# Patient Record
Sex: FEMALE | Race: OTHER | HISPANIC OR LATINO | URBAN - METROPOLITAN AREA
[De-identification: names, ages, dates, MRNs, and addresses within clinical notes are randomized per-mention and may not be internally consistent; named-entity substitution may affect disease eponyms.]

---

## 2022-04-21 ENCOUNTER — EMERGENCY (EMERGENCY)
Facility: HOSPITAL | Age: 67
LOS: 1 days | Discharge: HOME | End: 2022-04-21
Attending: STUDENT IN AN ORGANIZED HEALTH CARE EDUCATION/TRAINING PROGRAM
Payer: COMMERCIAL

## 2022-04-21 VITALS
HEART RATE: 66 BPM | OXYGEN SATURATION: 96 % | WEIGHT: 160.94 LBS | RESPIRATION RATE: 18 BRPM | DIASTOLIC BLOOD PRESSURE: 99 MMHG | SYSTOLIC BLOOD PRESSURE: 184 MMHG | TEMPERATURE: 98 F

## 2022-04-21 VITALS
DIASTOLIC BLOOD PRESSURE: 97 MMHG | RESPIRATION RATE: 18 BRPM | SYSTOLIC BLOOD PRESSURE: 167 MMHG | HEART RATE: 69 BPM | OXYGEN SATURATION: 96 % | TEMPERATURE: 98 F

## 2022-04-21 LAB
ALBUMIN SERPL ELPH-MCNC: 3.5 G/DL — SIGNIFICANT CHANGE UP (ref 3.5–5)
ALP SERPL-CCNC: 82 U/L — SIGNIFICANT CHANGE UP (ref 40–120)
ALT FLD-CCNC: 37 U/L DA — SIGNIFICANT CHANGE UP (ref 10–60)
ANION GAP SERPL CALC-SCNC: 5 MMOL/L — SIGNIFICANT CHANGE UP (ref 5–17)
AST SERPL-CCNC: 25 U/L — SIGNIFICANT CHANGE UP (ref 10–40)
BASOPHILS # BLD AUTO: 0.03 K/UL — SIGNIFICANT CHANGE UP (ref 0–0.2)
BASOPHILS NFR BLD AUTO: 0.4 % — SIGNIFICANT CHANGE UP (ref 0–2)
BILIRUB SERPL-MCNC: 0.4 MG/DL — SIGNIFICANT CHANGE UP (ref 0.2–1.2)
BUN SERPL-MCNC: 16 MG/DL — SIGNIFICANT CHANGE UP (ref 7–18)
CALCIUM SERPL-MCNC: 9.6 MG/DL — SIGNIFICANT CHANGE UP (ref 8.4–10.5)
CHLORIDE SERPL-SCNC: 109 MMOL/L — HIGH (ref 96–108)
CO2 SERPL-SCNC: 26 MMOL/L — SIGNIFICANT CHANGE UP (ref 22–31)
CREAT SERPL-MCNC: 0.63 MG/DL — SIGNIFICANT CHANGE UP (ref 0.5–1.3)
EGFR: 99 ML/MIN/1.73M2 — SIGNIFICANT CHANGE UP
EOSINOPHIL # BLD AUTO: 0.37 K/UL — SIGNIFICANT CHANGE UP (ref 0–0.5)
EOSINOPHIL NFR BLD AUTO: 5.4 % — SIGNIFICANT CHANGE UP (ref 0–6)
GLUCOSE SERPL-MCNC: 174 MG/DL — HIGH (ref 70–99)
HCT VFR BLD CALC: 37.2 % — SIGNIFICANT CHANGE UP (ref 34.5–45)
HGB BLD-MCNC: 12.4 G/DL — SIGNIFICANT CHANGE UP (ref 11.5–15.5)
IMM GRANULOCYTES NFR BLD AUTO: 0.3 % — SIGNIFICANT CHANGE UP (ref 0–1.5)
LYMPHOCYTES # BLD AUTO: 2.42 K/UL — SIGNIFICANT CHANGE UP (ref 1–3.3)
LYMPHOCYTES # BLD AUTO: 35.3 % — SIGNIFICANT CHANGE UP (ref 13–44)
MAGNESIUM SERPL-MCNC: 1.9 MG/DL — SIGNIFICANT CHANGE UP (ref 1.6–2.6)
MCHC RBC-ENTMCNC: 28 PG — SIGNIFICANT CHANGE UP (ref 27–34)
MCHC RBC-ENTMCNC: 33.3 GM/DL — SIGNIFICANT CHANGE UP (ref 32–36)
MCV RBC AUTO: 84 FL — SIGNIFICANT CHANGE UP (ref 80–100)
MONOCYTES # BLD AUTO: 0.59 K/UL — SIGNIFICANT CHANGE UP (ref 0–0.9)
MONOCYTES NFR BLD AUTO: 8.6 % — SIGNIFICANT CHANGE UP (ref 2–14)
NEUTROPHILS # BLD AUTO: 3.42 K/UL — SIGNIFICANT CHANGE UP (ref 1.8–7.4)
NEUTROPHILS NFR BLD AUTO: 50 % — SIGNIFICANT CHANGE UP (ref 43–77)
NRBC # BLD: 0 /100 WBCS — SIGNIFICANT CHANGE UP (ref 0–0)
PLATELET # BLD AUTO: 283 K/UL — SIGNIFICANT CHANGE UP (ref 150–400)
POTASSIUM SERPL-MCNC: 3.9 MMOL/L — SIGNIFICANT CHANGE UP (ref 3.5–5.3)
POTASSIUM SERPL-SCNC: 3.9 MMOL/L — SIGNIFICANT CHANGE UP (ref 3.5–5.3)
PROT SERPL-MCNC: 8.1 G/DL — SIGNIFICANT CHANGE UP (ref 6–8.3)
RBC # BLD: 4.43 M/UL — SIGNIFICANT CHANGE UP (ref 3.8–5.2)
RBC # FLD: 14.8 % — HIGH (ref 10.3–14.5)
SODIUM SERPL-SCNC: 140 MMOL/L — SIGNIFICANT CHANGE UP (ref 135–145)
WBC # BLD: 6.85 K/UL — SIGNIFICANT CHANGE UP (ref 3.8–10.5)
WBC # FLD AUTO: 6.85 K/UL — SIGNIFICANT CHANGE UP (ref 3.8–10.5)

## 2022-04-21 PROCEDURE — 80053 COMPREHEN METABOLIC PANEL: CPT

## 2022-04-21 PROCEDURE — 99283 EMERGENCY DEPT VISIT LOW MDM: CPT

## 2022-04-21 PROCEDURE — 36415 COLL VENOUS BLD VENIPUNCTURE: CPT

## 2022-04-21 PROCEDURE — 93005 ELECTROCARDIOGRAM TRACING: CPT

## 2022-04-21 PROCEDURE — 93010 ELECTROCARDIOGRAM REPORT: CPT | Mod: 76

## 2022-04-21 PROCEDURE — 83735 ASSAY OF MAGNESIUM: CPT

## 2022-04-21 PROCEDURE — 82962 GLUCOSE BLOOD TEST: CPT

## 2022-04-21 PROCEDURE — 85025 COMPLETE CBC W/AUTO DIFF WBC: CPT

## 2022-04-21 RX ORDER — SODIUM CHLORIDE 9 MG/ML
1000 INJECTION INTRAMUSCULAR; INTRAVENOUS; SUBCUTANEOUS ONCE
Refills: 0 | Status: COMPLETED | OUTPATIENT
Start: 2022-04-21 | End: 2022-04-21

## 2022-04-21 RX ORDER — MECLIZINE HCL 12.5 MG
1 TABLET ORAL
Qty: 12 | Refills: 0
Start: 2022-04-21 | End: 2022-04-23

## 2022-04-21 RX ORDER — MECLIZINE HCL 12.5 MG
25 TABLET ORAL ONCE
Refills: 0 | Status: COMPLETED | OUTPATIENT
Start: 2022-04-21 | End: 2022-04-21

## 2022-04-21 RX ORDER — ACETAMINOPHEN 500 MG
975 TABLET ORAL ONCE
Refills: 0 | Status: COMPLETED | OUTPATIENT
Start: 2022-04-21 | End: 2022-04-21

## 2022-04-21 RX ADMIN — Medication 25 MILLIGRAM(S): at 22:34

## 2022-04-21 RX ADMIN — SODIUM CHLORIDE 1000 MILLILITER(S): 9 INJECTION INTRAMUSCULAR; INTRAVENOUS; SUBCUTANEOUS at 20:48

## 2022-04-21 RX ADMIN — Medication 25 MILLIGRAM(S): at 20:48

## 2022-04-21 RX ADMIN — Medication 975 MILLIGRAM(S): at 20:48

## 2022-04-21 NOTE — ED PROVIDER NOTE - PHYSICAL EXAMINATION
General: well appearing female, no acute distress   HEENT: normocephalic, atraumatic   Respiratory: normal work of breathing, lungs clear to auscultation bilaterally   Cardiac: regular rate and rhythm   Abdomen: soft, non-tender, no guarding or rebound   MSK: no swelling or tenderness of lower extremities, moving all extremities spontaneously   Skin: warm, dry   Neuro: A&Ox3, cranial nerves II-XII intact, 5/5 strength in all extremities   Psych: appropriate affect

## 2022-04-21 NOTE — ED PROVIDER NOTE - OBJECTIVE STATEMENT
66F, pmh of htn presenting with one day of dizziness. feels like room spinning. no headache, changes in vision, chest pain or shortness of breath.

## 2022-04-21 NOTE — ED PROVIDER NOTE - CLINICAL SUMMARY MEDICAL DECISION MAKING FREE TEXT BOX
66F presenting with dizziness. non-focal neuro exam. likely peripheral vertigo. will get labs, symptom control. will reassess.

## 2022-04-21 NOTE — ED PROVIDER NOTE - PATIENT PORTAL LINK FT
You can access the FollowMyHealth Patient Portal offered by [facility] by registering at the following website: [email or web address]. By joining [employer]’s FollowMyHealth portal, you will also be able to view your health information using other applications (apps) compatible with our system.

## 2022-04-21 NOTE — ED PROVIDER NOTE - NSFOLLOWUPINSTRUCTIONS_ED_ALL_ED_FT
You were seen in the emergency department for dizziness.     Please follow-up with your primary care doctor in the next 24-48 hours.     If you have any worsening symptoms, severe headache, chest pain, shortness of breath, please return to the emergency department. Lo vieron en el departamento de emergencias por mareos.    Samuel un seguimiento con norman médico de atención primaria en las próximas 24 a 48 horas.    Si tiene algún síntoma que empeora, dolor de blane intenso, dolor en el pecho, dificultad para respirar, regrese al departamento de emergencias.    Translation via Google Translate. Cannot guarantee accuracy.     You were seen in the emergency department for dizziness.   Please follow-up with your primary care doctor in the next 24-48 hours.   If you have any worsening symptoms, severe headache, chest pain, shortness of breath, please return to the emergency department.

## 2022-04-21 NOTE — ED ADULT NURSE NOTE - OBJECTIVE STATEMENT
Patient presented to the ED with c/o dizziness. Denies any headache, chest pain, SOB, palpitations, nausea and vomiting.

## 2024-01-06 NOTE — ED PROVIDER NOTE - INTERNATIONAL TRAVEL
LVM regarding below x 2. Pain line number left to return call.   No 1 Principal Discharge DX:	Abdominal pain

## 2024-02-20 ENCOUNTER — EMERGENCY (EMERGENCY)
Facility: HOSPITAL | Age: 69
LOS: 1 days | Discharge: HOME | End: 2024-02-20
Attending: EMERGENCY MEDICINE
Payer: COMMERCIAL

## 2024-02-20 VITALS
TEMPERATURE: 98 F | RESPIRATION RATE: 20 BRPM | HEART RATE: 85 BPM | DIASTOLIC BLOOD PRESSURE: 80 MMHG | SYSTOLIC BLOOD PRESSURE: 137 MMHG | OXYGEN SATURATION: 98 %

## 2024-02-20 PROCEDURE — 99283 EMERGENCY DEPT VISIT LOW MDM: CPT | Mod: 25

## 2024-02-20 PROCEDURE — 73562 X-RAY EXAM OF KNEE 3: CPT | Mod: 26,LT

## 2024-02-20 PROCEDURE — 73562 X-RAY EXAM OF KNEE 3: CPT

## 2024-02-20 PROCEDURE — 99284 EMERGENCY DEPT VISIT MOD MDM: CPT

## 2024-02-20 RX ORDER — IBUPROFEN 200 MG
600 TABLET ORAL ONCE
Refills: 0 | Status: COMPLETED | OUTPATIENT
Start: 2024-02-20 | End: 2024-02-20

## 2024-02-20 RX ADMIN — Medication 600 MILLIGRAM(S): at 16:54

## 2024-02-20 RX ADMIN — Medication 600 MILLIGRAM(S): at 16:24

## 2024-02-20 NOTE — ED PROVIDER NOTE - NS ED ROS FT
Constitutional: (-) fever (-) chills  Musculoskeletal: (-) gait problem (+) knee pain   Skin: (-) wound  Neurological: (-) weakness (-) numbness

## 2024-02-20 NOTE — ED ADULT NURSE NOTE - OBJECTIVE STATEMENT
Patient presented to the ED complaining of left knee pain for 2 weeks. Patient denies any injury or fall.

## 2024-02-20 NOTE — ED PROVIDER NOTE - PHYSICAL EXAMINATION
Gen:  Awake, alert, NAD, WDWN, NCAT, non-toxic appearing.   Eyes:  PERRL, EOMI, no icterus, normal lids/lashes, normal conjunctivae.  ENT:  External inspection normal, pink/moist membranes.   CV:  Warm/well-perfused, no JVD. 2+ dp/pt, no edema, cords, or homans.  Resp:  Normal respiratory rate/effort, no respiratory distress, normal voice, speaking full sentences, no retractions.  Musculoskeletal:  N/V intact, FROM all 4 extremities, stable gait. L hip/ankle/foot: FROM, stable joints, no effusion/swelling, no cuco tender/deformity. L knee:  FROM/full extension, neg ant/post drawer, neg varus/valgus tender, neg grind, no effusion/swelling, no cuco tender/deformity.   Neck:  FROM neck, trachea midline.  Skin:  Color normal for race, warm and dry, no rash to visible skin regions. No lacerations, abrasions, or ecchymosis.   Neuro:  Oriented x3, CN 2-12 intact (grossly), normal motor (grossly), normal sensory (grossly), normal gait.  Psych:  Attention normal. Affect normal. Behavior normal. Judgment normal.

## 2024-02-20 NOTE — ED PROVIDER NOTE - NSFOLLOWUPINSTRUCTIONS_ED_ALL_ED_FT
Please follow up with your PMD or Medicine Clinic in 2-3 days.  Follow up with Orthopedics in 1 week.  Return to the ER for worsening or concerning symptoms.  Take Ibuprofen (Advil or Motrin) 600mg every 6 hours as needed for pain or fever with food.  Take Acetaminophen (Tylenol) 650mg every 6 hours as needed for pain or fever.  Apply ice to swollen joints or areas, rest extremity and keep elevated.    - - - - - - - - - - - - -  Dolor gideon de rodilla en los adultos  Acute Knee Pain, Adult  El dolor de rodilla puede tener muchas causas. A veces, el dolor de rodilla es repentino (gideon) y puede deberse a daño, hinchazón o irritación de los músculos y tejidos que sostienen la rodilla.    A menudo, el dolor desaparece solo con el tiempo y con reposo. Si el dolor no desaparece, pueden hacerse pruebas para hallar norman causa.    Siga estas instrucciones en norman casa:  Si tiene sandoval rodillera o un dispositivo ortopédico:      Use la rodillera o el dispositivo ortopédico lydia se lo haya indicado el médico. Quíteselos solamente lydia se lo haya indicado el médico.  Aflójeselos si los dedos del pie:  Hormiguean.  Se adormecen.  Se tornan fríos y de color hortencia.  Manténgalos limpios.  Si la rodillera o el dispositivo ortopédico no son impermeables:  No deje que se mojen.  Cúbralos con un envoltorio hermético cuando tome un baño de inmersión o sandoval ducha.  Actividad    Descanse la rodilla.  No jaquan cosas que le causen dolor o que lo intensifiquen.  Evite las actividades en las que ambos pies no estén en contacto con el piso al mismo tiempo (actividades de alto impacto). Algunos ejemplos son correr, saltar la soga y hacer eliza de tijera.  Trabaje con un fisioterapeuta para crear un programa de ejercicios seguros, lydia le haya indicado el médico.  Control del dolor, la rigidez y la hinchazón      Si se lo indican, aplique hielo sobre la rodilla. Para hacer esto:  Si tiene sandoval rodillera o un dispositivo ortopédico que se puede quitar, proceda lydia se lo haya indicado el médico.  Ponga el hielo en sandoval bolsa plástica.  Coloque sandoval toalla entre la piel y la bolsa.  Aplique el hielo anila 20 minutos, 2 o 3 veces por día.  Retire el hielo si la piel se le pone de color cifuentes brillante. [place] es muy importante. Si no puede sentir dolor, calor o frío, tiene un mayor riesgo de que se dañe la rocio.  Si se lo indican, use sandoval venda elástica para ejercer presión (compresión) en la rodilla lesionada.  Cuando esté sentado o acostado, eleve la rodilla por encima del nivel del corazón.  Duerma con sandoval almohada debajo de la rodilla.  Indicaciones generales    Use los medicamentos de venta henry y los recetados solamente lydia se lo haya indicado el médico.  No fume ni consuma ningún producto que contenga nicotina o tabaco. Si necesita ayuda para dejar de consumir estos productos, consulte al médico.  Si tiene sobrepeso, trabaje con norman médico y un experto en alimentos (nutricionista) para establecer metas para bajar de peso. El sobrepeso puede aumentar el dolor de rodilla.  Controle si hay algún cambio en po síntomas.  Cumpla con todas las visitas de seguimiento.  Comuníquese con un médico si:  El dolor de rodilla no desaparece.  El dolor de rodilla cambia o empeora.  Tiene fiebre junto con dolor de rodilla.  La rodilla está enrojecida o se siente caliente al tacto.  La rodilla le magnus o se le queda trabada.  Solicite ayuda de inmediato si:  La rodilla se le hincha, y la hinchazón empeora.  No puede  la rodilla.  Tiene un dolor muy intenso en la rodilla que no se shoaib con analgésicos.  Resumen  El dolor de rodilla puede tener muchas causas. A menudo, el dolor desaparece solo con el tiempo y con reposo.  El médico puede hacerle estudios para averiguar la causa del dolor.  Controle si hay algún cambio en po síntomas. Alivie el dolor con descanso, medicamentos, actividad de poca intensidad y el uso de hielo.  Solicite ayuda de inmediato si no puede  la rodilla o si el dolor de rodilla es muy intenso.  Esta información no tiene lydia fin reemplazar el consejo del médico. Asegúrese de hacerle al médico cualquier pregunta que tenga.

## 2024-02-20 NOTE — ED PROVIDER NOTE - PATIENT PORTAL LINK FT
You can access the FollowMyHealth Patient Portal offered by [facility] by registering at the following website: [email or web address]. By joining [employer]s FollowMyHealth portal, you will also be able to view your health information using other applications (apps) compatible with our system.

## 2024-02-20 NOTE — ED PROVIDER NOTE - CLINICAL SUMMARY MEDICAL DECISION MAKING FREE TEXT BOX
68 female with hx of DM, & HTN.   Pt presenting to the ED reporting left knee pain x 2 weeks worse with ambulation & movement.  No trauma.  No new exercise or strain.    Vitals stable.  Nontoxic appearing, n/v intact.  Airway intact, no respiratory distress, no hypoxia.  No evidence of septic arthritis.    Plan to obtain:    -XR, analgesia as needed, likely discharge with orthopedics follow-up.    Lab values demonstrate no acute/emergent pathology.  My independent interpretation of XR: No fx/dislocation  Analgesia given    Pt advised regarding need for close outpatient follow up.  Patient stable for further care in outpatient setting. No indication for inpatient admission at this time. Patient advised regarding symptomatic & supportive care and symptoms to prompt ED return. Strict return precautions provided.

## 2024-02-20 NOTE — ED PROVIDER NOTE - OBJECTIVE STATEMENT
68 female with hx of DM, & HTN.   Pt presenting to the ED reporting left knee pain x 2 weeks worse with ambulation & movement.  No trauma.  No new exercise or strain.  No recent travel, hospitalization, or immobilization

## 2024-02-20 NOTE — ED PROVIDER NOTE - NSFOLLOWUPCLINICS_GEN_ALL_ED_FT
[facility]  Internal Medicine  [address] [place] [address]  Phone: [phone]  Fax: [phone]  Follow Up Time: 1-3 Days    [facility]  Orthopedics  [address] [place] [address]  Phone: [phone]  Fax: [phone]  Follow Up Time: 4-6 Days

## 2024-03-18 ENCOUNTER — APPOINTMENT (OUTPATIENT)
Age: 69
End: 2024-03-18
Payer: COMMERCIAL

## 2024-03-18 VITALS
DIASTOLIC BLOOD PRESSURE: 69 MMHG | WEIGHT: 154 LBS | SYSTOLIC BLOOD PRESSURE: 137 MMHG | OXYGEN SATURATION: 99 % | BODY MASS INDEX: 32.32 KG/M2 | HEIGHT: 58 IN | HEART RATE: 97 BPM

## 2024-03-18 DIAGNOSIS — M25.561 PAIN IN RIGHT KNEE: ICD-10-CM

## 2024-03-18 DIAGNOSIS — M25.562 PAIN IN RIGHT KNEE: ICD-10-CM

## 2024-03-18 PROBLEM — E11.9 TYPE 2 DIABETES MELLITUS WITHOUT COMPLICATIONS: Chronic | Status: ACTIVE | Noted: 2024-02-20

## 2024-03-18 PROBLEM — Z00.00 ENCOUNTER FOR PREVENTIVE HEALTH EXAMINATION: Status: ACTIVE | Noted: 2024-03-18

## 2024-03-18 PROBLEM — I10 ESSENTIAL (PRIMARY) HYPERTENSION: Chronic | Status: ACTIVE | Noted: 2024-02-20

## 2024-03-18 PROCEDURE — 20611 DRAIN/INJ JOINT/BURSA W/US: CPT | Mod: LT

## 2024-03-18 PROCEDURE — 99204 OFFICE O/P NEW MOD 45 MIN: CPT | Mod: 25

## 2024-03-18 NOTE — HISTORY OF PRESENT ILLNESS
[de-identified] : SHERIE MOORE is a 68 year old female presenting with acute on chronic left knee pain over the last few months with recent worsening.  She is also dealing with right-sided Achilles and heel pain.  In terms of her left knee she says the pain has gotten so bad that it is present constant throughout the day and is exacerbated with walking.  The pain is mostly in the front of the knee as well as the inside of the knee and does feel it at times diffusely throughout the entirety of the knee.  She has not had any treatment up until this point however states she is in significant pain unable to ambulate perform her activities at work or perform her ADLs due to the pain.

## 2024-03-18 NOTE — PHYSICAL EXAM
[de-identified] : Knee (left)   Inspection  Skin: normal  Effusion: normal  Bursa swelling: none   Palpation  Tenderness: Severe Location: Medial and lateral facets, medial joint line  Crepitus: none.  Defect: none.  Popliteal fullness: negative.   Flexion  Active ROM: Limited to 90 degrees Passive ROM: Limited to 100 degrees  Extension  Limited by 5 degrees    Straight Leg Raise- can perform  Motor strength  Flexion: 5/5  Extension: 5/5   Sensory index  Normal.   ACL/PCL tests  Lachman test: stable  Anterior drawer: stable  Posterior drawer: stable   MCL/LCL tests  MCL laxity: stable  LCL laxity: stable   Patellofemoral tests  Patellar grind test: Positive Patellar apprehension: negative   Meniscal tests  Deidra's test: normal  Thessalys: Normal  [de-identified] : XR of left knee Date: 3/2024   Views: 3 views Performed at [facility]: No Impression: Mild medial compartment joint space narrowing with mild sclerotic changes consistent with mild to moderate knee arthritis.  Also large patellar tendon enthesophyte noted.  These images were personally reviewed with original findings documented as above.

## 2024-03-18 NOTE — DISCUSSION/SUMMARY
[de-identified] : SHERIE MOORE is a 68 year old female presenting with acute on chronic significant left knee pain consistent with mild to moderate knee osteoarthritis flare.  Patient has not trialed any treatments at this point however she is in significant pain and is unable to ambulate or perform her duties at work at this time due to the pain.  Discussed with patient's these flareups will come and go and getting the appropriate treatment options will be helpful.  Overall recommend the followin.  Left knee CSI performed today as above with moderate postprocedural relief 2.  Referral given to physical therapy which I discussed with the patient she would likely be able to perform moving forward once the pain subsides a bit 3.  Diclofenac 75 mg to be taken up to twice a day for the next 2 to 4 weeks prescribed 4.  Letter excusing the patient from work for the next 2 weeks was given.  At that time she will follow-up to be reevaluated.

## 2024-03-18 NOTE — RETURN TO WORK/SCHOOL
[Work] : work [___ Weeks] : I will re-evaluate the patient in [unfilled] week(s), at which time I will provide an update to their current status [FreeTextEntry2] : Lazarus Erwin MD, FAAPMR, CAQSM   Sports Medicine Physiatrist [facility]

## 2024-03-18 NOTE — PROCEDURE
[de-identified] : Ultrasound Guided Injection   Indication: Ensure placement within the intra-articular knee joint utilizing the [employer] portable ultrasound machine, the Linear 25mm 15-4 MHz transducer, sterile ultrasound gel, ultrasound guidance with the probe in short axis to the joint , utilizing an in-plane approach, was used for the following injection:    Injection: LEFT intra-articular knee joint.  Indication: Knee osteoarthritis.  A discussion was had with the patient regarding this procedure and all questions were answered. All risks, benefits and alternatives were discussed. These included but were not limited to bleeding, infection, and allergic reaction. A timeout was performed prior to the procedure to ensure proper side.  Chlorhexidine was used to sterilize the skin overlying the knee joint.  A 21-gauge needle was used to inject 1cc of 0.5% Ropivacaine, 1cc 1% Lidocaine, 1cc of 40mg/mL Depo-Medrol into the joint from a superolateral approach. A sterile bandage was then applied. The patient tolerated the procedure well and there were no complications.

## 2024-03-21 RX ORDER — DICLOFENAC SODIUM 75 MG/1
75 TABLET, DELAYED RELEASE ORAL
Qty: 42 | Refills: 1 | Status: ACTIVE | COMMUNITY
Start: 2024-03-21 | End: 1900-01-01

## 2024-04-01 ENCOUNTER — APPOINTMENT (OUTPATIENT)
Age: 69
End: 2024-04-01
Payer: COMMERCIAL

## 2024-04-01 VITALS
OXYGEN SATURATION: 92 % | WEIGHT: 152 LBS | BODY MASS INDEX: 31.91 KG/M2 | SYSTOLIC BLOOD PRESSURE: 154 MMHG | HEART RATE: 87 BPM | DIASTOLIC BLOOD PRESSURE: 90 MMHG | HEIGHT: 58 IN

## 2024-04-01 DIAGNOSIS — M17.12 UNILATERAL PRIMARY OSTEOARTHRITIS, LEFT KNEE: ICD-10-CM

## 2024-04-01 PROCEDURE — 99213 OFFICE O/P EST LOW 20 MIN: CPT

## 2024-04-01 NOTE — HISTORY OF PRESENT ILLNESS
[de-identified] : SHERIE MOORE is a 68 year old female presenting with acute on chronic left knee pain over the last few months with recent worsening.  She is also dealing with right-sided Achilles and heel pain.  In terms of her left knee she says the pain has gotten so bad that it is present constant throughout the day and is exacerbated with walking.  The pain is mostly in the front of the knee as well as the inside of the knee and does feel it at times diffusely throughout the entirety of the knee.  She has not had any treatment up until this point however states she is in significant pain unable to ambulate perform her activities at work or perform her ADLs due to the pain.  Interval history: Patient states she is approximately 80% better from the injection.  Had difficulty scheduling physical therapy and states that her appointment today interfered with her physical therapy appointment and she will try and reschedule.  States she feels ready to return to work.

## 2024-04-01 NOTE — DISCUSSION/SUMMARY
[de-identified] : SHERIE MOORE is a 68 year old female presenting with acute on chronic significant left knee pain consistent with mild to moderate knee osteoarthritis flare.  She made significant improvements after knee injection and will attempt to reschedule physical therapy as she had not been able to start yet.  Overall recommend the followin.  Patient will reschedule her physical therapy visit for today and moving forward. 2.  Diclofenac 75 mg to be taken up to twice a day for the next 2 to 4 weeks: Continue 3.  Discussed given her significant improvement in pain she can return to work patient agrees with this plan 4.  Patient will follow-up in 2 months as needed can consider repeat injection or hyaluronic acid injection.

## 2024-04-01 NOTE — PHYSICAL EXAM
[de-identified] : Knee (left)   Inspection  Skin: normal  Effusion: normal  Bursa swelling: none   Palpation  Tenderness: Mild Location: Medial and lateral facets, medial joint line  Crepitus: none.  Defect: none.  Popliteal fullness: negative.   Flexion  Active ROM: Limited to 100 degrees Passive ROM: Limited to 100 degrees  Extension  Limited by 5 degrees    Straight Leg Raise- can perform  Motor strength  Flexion: 5/5  Extension: 5/5   Sensory index  Normal.   ACL/PCL tests  Lachman test: stable  Anterior drawer: stable  Posterior drawer: stable   MCL/LCL tests  MCL laxity: stable  LCL laxity: stable   Patellofemoral tests  Patellar grind test: Positive Patellar apprehension: negative   Meniscal tests  Deidra's test: normal  Thessalys: Normal